# Patient Record
Sex: MALE | Race: WHITE | NOT HISPANIC OR LATINO | Employment: FULL TIME | URBAN - METROPOLITAN AREA
[De-identification: names, ages, dates, MRNs, and addresses within clinical notes are randomized per-mention and may not be internally consistent; named-entity substitution may affect disease eponyms.]

---

## 2019-02-18 ENCOUNTER — OFFICE VISIT (OUTPATIENT)
Dept: URGENT CARE | Facility: CLINIC | Age: 44
End: 2019-02-18
Payer: MEDICAID

## 2019-02-18 VITALS
SYSTOLIC BLOOD PRESSURE: 146 MMHG | TEMPERATURE: 99 F | DIASTOLIC BLOOD PRESSURE: 92 MMHG | HEART RATE: 69 BPM | BODY MASS INDEX: 31.55 KG/M2 | HEIGHT: 69 IN | RESPIRATION RATE: 18 BRPM | WEIGHT: 213 LBS | OXYGEN SATURATION: 98 %

## 2019-02-18 DIAGNOSIS — Z20.828 EXPOSURE TO THE FLU: Primary | ICD-10-CM

## 2019-02-18 PROCEDURE — 99203 OFFICE O/P NEW LOW 30 MIN: CPT | Mod: S$GLB,,, | Performed by: NURSE PRACTITIONER

## 2019-02-18 PROCEDURE — 99203 PR OFFICE/OUTPT VISIT, NEW, LEVL III, 30-44 MIN: ICD-10-PCS | Mod: S$GLB,,, | Performed by: NURSE PRACTITIONER

## 2019-02-18 RX ORDER — OSELTAMIVIR PHOSPHATE 75 MG/1
75 CAPSULE ORAL DAILY
Qty: 10 CAPSULE | Refills: 0 | Status: SHIPPED | OUTPATIENT
Start: 2019-02-18 | End: 2019-02-28

## 2019-02-19 NOTE — PROGRESS NOTES
"Subjective:       Patient ID: Castillo Vasquez is a 43 y.o. male.    Vitals:  height is 5' 9" (1.753 m) and weight is 96.6 kg (213 lb). His oral temperature is 98.5 °F (36.9 °C). His blood pressure is 146/92 (abnormal) and his pulse is 69. His respiration is 18 and oxygen saturation is 98%.     Chief Complaint: Fever    New problem father states exposed to flu by daughter, denies any symptoms       Fever    This is a new problem. The current episode started today. Pertinent negatives include no congestion, coughing, ear pain, nausea, rash, sore throat, vomiting or wheezing. He has tried nothing for the symptoms. The treatment provided no relief.       Constitution: Negative for chills, sweating, fatigue and fever.   HENT: Negative for ear pain, congestion, sinus pain, sinus pressure, sore throat and voice change.    Neck: Negative for painful lymph nodes.   Eyes: Negative for eye redness.   Respiratory: Negative for chest tightness, cough, sputum production, bloody sputum, COPD, shortness of breath, stridor, wheezing and asthma.    Gastrointestinal: Negative for nausea and vomiting.   Musculoskeletal: Negative for muscle ache.   Skin: Negative for rash.   Allergic/Immunologic: Negative for seasonal allergies and asthma.   Hematologic/Lymphatic: Negative for swollen lymph nodes.       Objective:      Physical Exam   Constitutional: He is oriented to person, place, and time. He appears well-developed and well-nourished. He is cooperative.  Non-toxic appearance. He does not appear ill. No distress.   HENT:   Head: Normocephalic and atraumatic.   Right Ear: Hearing, external ear and ear canal normal. A middle ear effusion is present.   Left Ear: Hearing, external ear and ear canal normal. A middle ear effusion is present.   Nose: Nose normal. No mucosal edema, rhinorrhea or nasal deformity. No epistaxis. Right sinus exhibits no maxillary sinus tenderness and no frontal sinus tenderness. Left sinus exhibits no maxillary " sinus tenderness and no frontal sinus tenderness.   Mouth/Throat: Uvula is midline, oropharynx is clear and moist and mucous membranes are normal. No trismus in the jaw. Normal dentition. No uvula swelling. No posterior oropharyngeal erythema.   Eyes: Conjunctivae and lids are normal. Right eye exhibits no discharge. Left eye exhibits no discharge. No scleral icterus.   Sclera clear bilat   Neck: Trachea normal, normal range of motion, full passive range of motion without pain and phonation normal. Neck supple.   Cardiovascular: Normal rate, regular rhythm, normal heart sounds, intact distal pulses and normal pulses.   Pulmonary/Chest: Effort normal and breath sounds normal. No respiratory distress.   Abdominal: Soft. Normal appearance and bowel sounds are normal. He exhibits no distension, no pulsatile midline mass and no mass. There is no tenderness.   Musculoskeletal: Normal range of motion. He exhibits no edema or deformity.   Neurological: He is alert and oriented to person, place, and time. He exhibits normal muscle tone. Coordination normal.   Skin: Skin is warm, dry and intact. He is not diaphoretic. No pallor.   Psychiatric: He has a normal mood and affect. His speech is normal and behavior is normal. Judgment and thought content normal. Cognition and memory are normal.   Nursing note and vitals reviewed.      Assessment:       1. Exposure to the flu        Plan:         Exposure to the flu    Other orders  -     oseltamivir (TAMIFLU) 75 MG capsule; Take 1 capsule (75 mg total) by mouth once daily. for 10 days  Dispense: 10 capsule; Refill: 0    There are no Patient Instructions on file for this visit.

## 2019-05-15 ENCOUNTER — OFFICE VISIT (OUTPATIENT)
Dept: URGENT CARE | Facility: CLINIC | Age: 44
End: 2019-05-15
Payer: MEDICAID

## 2019-05-15 VITALS
DIASTOLIC BLOOD PRESSURE: 94 MMHG | OXYGEN SATURATION: 95 % | WEIGHT: 213 LBS | SYSTOLIC BLOOD PRESSURE: 139 MMHG | BODY MASS INDEX: 31.55 KG/M2 | RESPIRATION RATE: 18 BRPM | HEIGHT: 69 IN | TEMPERATURE: 98 F | HEART RATE: 90 BPM

## 2019-05-15 DIAGNOSIS — R35.0 URINARY FREQUENCY: Primary | ICD-10-CM

## 2019-05-15 LAB
BILIRUB UR QL STRIP: NEGATIVE
GLUCOSE UR QL STRIP: NEGATIVE
KETONES UR QL STRIP: NEGATIVE
LEUKOCYTE ESTERASE UR QL STRIP: NEGATIVE
PH, POC UA: 5 (ref 5–8)
POC BLOOD, URINE: NEGATIVE
POC NITRATES, URINE: NEGATIVE
PROT UR QL STRIP: NEGATIVE
SP GR UR STRIP: 1.02 (ref 1–1.03)
UROBILINOGEN UR STRIP-ACNC: NORMAL (ref 0.3–2.2)

## 2019-05-15 PROCEDURE — 99214 OFFICE O/P EST MOD 30 MIN: CPT | Mod: 25,S$GLB,, | Performed by: FAMILY MEDICINE

## 2019-05-15 PROCEDURE — 81003 URINALYSIS AUTO W/O SCOPE: CPT | Mod: QW,S$GLB,, | Performed by: FAMILY MEDICINE

## 2019-05-15 PROCEDURE — 81003 POCT URINALYSIS, DIPSTICK, AUTOMATED, W/O SCOPE: ICD-10-PCS | Mod: QW,S$GLB,, | Performed by: FAMILY MEDICINE

## 2019-05-15 PROCEDURE — 99214 PR OFFICE/OUTPT VISIT, EST, LEVL IV, 30-39 MIN: ICD-10-PCS | Mod: 25,S$GLB,, | Performed by: FAMILY MEDICINE

## 2019-05-15 RX ORDER — SULFAMETHOXAZOLE AND TRIMETHOPRIM 800; 160 MG/1; MG/1
1 TABLET ORAL 2 TIMES DAILY
Qty: 10 TABLET | Refills: 1 | Status: CANCELLED | OUTPATIENT
Start: 2019-05-15

## 2019-05-15 RX ORDER — SULFAMETHOXAZOLE AND TRIMETHOPRIM 800; 160 MG/1; MG/1
1 TABLET ORAL 2 TIMES DAILY
Qty: 20 TABLET | Refills: 0 | OUTPATIENT
Start: 2019-05-15 | End: 2023-01-08

## 2019-05-16 NOTE — PROGRESS NOTES
"Subjective:       Patient ID: Castillo Vasquez is a 43 y.o. male.    Vitals:  height is 5' 9" (1.753 m) and weight is 96.6 kg (213 lb). His oral temperature is 98 °F (36.7 °C). His blood pressure is 139/94 (abnormal) and his pulse is 90. His respiration is 18 and oxygen saturation is 95%.     Chief Complaint: Back Pain (LOWER) and Urinary Frequency (urgency)    Intermittent lower back and left flank pain with urinary frequency/urgency and decreased output. Felt abdominal discomfort and had loose stool with cramping and nausea a few hours ago. Reports subjective fever. Denies risks for sti.     Back Pain   This is a new problem. The current episode started in the past 7 days. The problem occurs intermittently. The problem has been gradually worsening since onset. The pain is present in the lumbar spine. The quality of the pain is described as aching. Pertinent negatives include no abdominal pain, dysuria or fever.   Urinary Frequency    Associated symptoms include flank pain, frequency, nausea and urgency. Pertinent negatives include no chills, hematuria, vomiting, constipation or rash.       Constitution: Positive for fatigue. Negative for appetite change, chills and fever.   HENT: Negative for ear pain, ear discharge, tinnitus, congestion and sore throat.    Neck: Negative for painful lymph nodes.   Gastrointestinal: Positive for nausea and diarrhea. Negative for abdominal pain, vomiting and constipation.   Genitourinary: Positive for frequency, urgency, urine decreased and flank pain. Negative for dysuria, hematuria, history of kidney stones, genital trauma, painful intercourse, genital sore, penile discharge, painful ejaculation, penile pain, penile swelling, scrotal swelling and testicular pain.   Musculoskeletal: Positive for pain and back pain.   Skin: Negative for rash, lesion, erythema and bruising.   Hematologic/Lymphatic: Negative for swollen lymph nodes.       Objective:      Physical Exam   Constitutional: " He is oriented to person, place, and time. He appears well-developed and well-nourished. No distress.   HENT:   Head: Normocephalic and atraumatic.   Nose: Nose normal. No nasal deformity. No epistaxis.   Mouth/Throat: Mucous membranes are normal.   Eyes: Conjunctivae and lids are normal.   Neck: Trachea normal, normal range of motion and phonation normal. Neck supple.   Cardiovascular: Normal rate.   Pulmonary/Chest: Effort normal.   Abdominal: Soft. Normal appearance. He exhibits no distension. There is no CVA tenderness.   Musculoskeletal: Normal range of motion.   Neurological: He is alert and oriented to person, place, and time. He has normal reflexes.   Skin: Skin is warm, dry and intact. He is not diaphoretic. No erythema.   Psychiatric: He has a normal mood and affect. His speech is normal and behavior is normal. Judgment and thought content normal. Cognition and memory are normal.   Nursing note and vitals reviewed.      Assessment:       1. Urinary frequency        Plan:         Urinary frequency  -     POCT Urinalysis, Dipstick, Automated, W/O Scope  -     Urine culture    Other orders  -     sulfamethoxazole-trimethoprim 800-160mg (BACTRIM DS) 800-160 mg Tab; Take 1 tablet by mouth 2 (two) times daily.  Dispense: 20 tablet; Refill: 0        d/w pt re prostatitis as ddx. Deferred exam at time.  Pt v/u. Advised f/u with pcp

## 2019-05-19 ENCOUNTER — TELEPHONE (OUTPATIENT)
Dept: URGENT CARE | Facility: CLINIC | Age: 44
End: 2019-05-19

## 2019-05-19 LAB
BACTERIA UR CULT: NO GROWTH
BACTERIA UR CULT: NORMAL

## 2019-05-19 NOTE — TELEPHONE ENCOUNTER
1/3 attempts  Left Vm  ----- Message from Johnna Haynes NP sent at 5/19/2019  9:17 AM CDT -----  Please inform patient of negative results

## 2019-05-20 ENCOUNTER — TELEPHONE (OUTPATIENT)
Dept: URGENT CARE | Facility: CLINIC | Age: 44
End: 2019-05-20

## 2019-05-20 NOTE — TELEPHONE ENCOUNTER
Left VM  ----- Message from Johnna Haynes NP sent at 5/19/2019  9:17 AM CDT -----  Please inform patient of negative results

## 2019-05-21 ENCOUNTER — TELEPHONE (OUTPATIENT)
Dept: URGENT CARE | Facility: CLINIC | Age: 44
End: 2019-05-21

## 2019-05-21 NOTE — TELEPHONE ENCOUNTER
----- Message from Johnna Haynes NP sent at 5/19/2019  9:17 AM CDT -----  Please inform patient of negative results

## 2019-10-14 ENCOUNTER — OFFICE VISIT (OUTPATIENT)
Dept: URGENT CARE | Facility: CLINIC | Age: 44
End: 2019-10-14
Payer: MEDICAID

## 2019-10-14 VITALS
DIASTOLIC BLOOD PRESSURE: 90 MMHG | HEART RATE: 93 BPM | RESPIRATION RATE: 16 BRPM | HEIGHT: 69 IN | WEIGHT: 213 LBS | OXYGEN SATURATION: 95 % | BODY MASS INDEX: 31.55 KG/M2 | SYSTOLIC BLOOD PRESSURE: 140 MMHG | TEMPERATURE: 99 F

## 2019-10-14 DIAGNOSIS — Z76.89 ENCOUNTER TO ESTABLISH CARE: ICD-10-CM

## 2019-10-14 DIAGNOSIS — L03.116 CELLULITIS OF LEFT LOWER EXTREMITY: Primary | ICD-10-CM

## 2019-10-14 DIAGNOSIS — L03.011 PARONYCHIA OF RIGHT INDEX FINGER: ICD-10-CM

## 2019-10-14 PROCEDURE — 10060 I&D ABSCESS SIMPLE/SINGLE: CPT | Mod: S$GLB,,, | Performed by: PHYSICIAN ASSISTANT

## 2019-10-14 PROCEDURE — 99214 PR OFFICE/OUTPT VISIT, EST, LEVL IV, 30-39 MIN: ICD-10-PCS | Mod: 25,S$GLB,, | Performed by: PHYSICIAN ASSISTANT

## 2019-10-14 PROCEDURE — 10060 INCISION & DRAINAGE: ICD-10-PCS | Mod: S$GLB,,, | Performed by: PHYSICIAN ASSISTANT

## 2019-10-14 PROCEDURE — 99214 OFFICE O/P EST MOD 30 MIN: CPT | Mod: 25,S$GLB,, | Performed by: PHYSICIAN ASSISTANT

## 2019-10-14 RX ORDER — DOXYCYCLINE HYCLATE 100 MG
100 TABLET ORAL 2 TIMES DAILY
Qty: 20 TABLET | Refills: 0 | Status: SHIPPED | OUTPATIENT
Start: 2019-10-14 | End: 2019-10-24

## 2019-10-14 NOTE — PROCEDURES
Incision & Drainage  Date/Time: 10/14/2019 12:25 PM  Performed by: Hola Doe PA-C  Authorized by: Hola Doe PA-C       Type:  Abscess  Body area:  Upper extremity  Location details:  Right index finger  Local anesthetic: LET (lido,epi,tetracaine)  Scalpel size: 18g needle.  Incision type:  Single straight  Complexity:  Simple  Drainage:  Pus and serosanguinous  Drainage amount:  Scant  Wound treatment:  Wound left open and expression of material  Patient tolerance:  Patient tolerated the procedure well with no immediate complications

## 2019-10-14 NOTE — PATIENT INSTRUCTIONS
"  Primary care at [facility]   Phone: [phone]     · Medicaid Access Line - helps Medicaid patients to find Medicaid care OUTSIDE of [facility]. Medicaid Access Line contact is: [phone]  · www[email or web address] - 8386306653 - a community line that can help refer              · Access Navigator team who handles Medicaid referrals INTO [facility]. The main line for that team (for your referrals into [facility]) is [phone] Access Line - helps Medicaid patients to find Medicaid care OUTSIDE of [facility]. Medicaid Access Line contact is: [phone]  · www.[employer] - 9710604049 - a community line that can help refer          Impetigo  Impetigo is a common bacterial infection of the skin that can appear on many parts of the body. It can happen to anyone, of any age, but is more common in children. For this reason, it used to be called "school sores."  Causes  Its normal to get scrapes on your body from activity or from scratching your skin. The skin normally has bacteria on it. Sometimes an impetigo infection can start on healthy skin. But it usually starts when there is an injury to the skin, or break in the skin. Although nothing usually happens, the bacteria normally on the skin can cause infection. This is the most common way people get impetigo.  Impetigo is very contagious. So once there is an infection, it needs to be treated so it doesn't get worse, spread to other areas, or to other people. Impetigo can easily be passed to other family members, friends, schoolmates, or co-workers, through scratching, rubbing, or touching an infected area. Common causes include:  · After a cold  · Bites  · From another infected person  · Injury to skin  · Insect bites  · Other skin problems that are infected, such as eczema  · Scratches  Symptoms  There is often a skin injury like a scratch, scrape, or insect bite that may have gone unnoticed or been ignored before the infection began. Symptoms of impetigo " include:  · Red, inflamed area or rash  · One or many red bumps  · Bumps that turn into blisters filled with yellow fluid or pus  · Blisters break or leak causing honey-colored crusting or scabbing over the area  · Skin sores that spread to other surrounding areas  Home care  The following guidelines will help you care for your infection at home.  Wound care  · Trim fingernails and cover sores with an adhesive bandage, if needed, to prevent scratching. Picking at the sores may leave a scar.  · If the infection is on or around your lips, don't lick or chew on the sores. This will make the infection worse.  · If a bandage or dressing is used, you can put a nonstick dressing over it.  · Wash your hands and your childs hands often. This will avoid spreading the infection to other parts of the body and to other people. Do not share the infected persons washcloths, towels, pillows, sheets, or clothes with others. Wash these items in hot water before using again.  · Clean the area several times a day. You dont want to scrub the area. The best way to do this is to soak the sores in warm, soapy water until they get soft enough to be wiped away. This will help remove the crust that forms from the dried liquid. In areas that you cant soak, like the mouth or face, you can put a clean, warm washcloth over the infected are for 5 to 10 minutes at a time, until the scabs soften enough to remove.  Medicines  · You can use over-the-counter medicine as directed based on age and weight for pain, fever, fussiness, or discomfort, unless another medicine was prescribed. In infants ages 6 months and older, you may use ibuprofen as well as acetaminophen. You can alternate them, or use both together. They work differently and are a different class of medicines, so taking them together is not an overdose. If you or your child has chronic liver or kidney disease or ever had a stomach ulcer or gastrointestinal bleeding, talk with your  healthcare provider before using these medicines. Also talk with your healthcare provider if your child is taking blood-thinner medicines.  · Do not give aspirin to your child. Aspirin should never be used in children ages 18 and younger who is ill with a fever. It may cause severe disease or death.   · Impetigo can often be cured with topical creams. Apply these as directed by your healthcare provider.  · If you were given oral antibiotics, take them until they are used up. It is important to finish the antibiotics even if the wound looks better to make sure the infection has cleared.  Follow-up care  Follow up with your healthcare provider if the sores continue to spread after 3 days of treatment. It will take about 7 to 10 days to heal completely.  Your child should stay out of school until completing 2 full days of antibiotic treatment.  When to seek medical advice  Call your healthcare provider right away if any of the following occur:  · Fever of 100.4°F (38°C) or higher, or as directed  · Increased amounts of fluid or pus coming from the sores  · Increasing number of sores or spreading areas of redness after 2 days of treatment with antibiotics  · Increasing swelling or pain  · Loss of appetite or vomiting  · Unusual drowsiness, weakness, or change in behavior  Date Last Reviewed: 8/1/2016  © 1051-1740 [employer]. [address] [place] [address], [place] [id number]. All rights reserved. This information is not intended as a substitute for professional medical care. Always follow your healthcare professional's instructions.        Cellulitis  Cellulitis is an infection of the deep layers of skin. A break in the skin, such as a cut or scratch, can let bacteria under the skin. If the bacteria get to deep layers of the skin, it can be serious. If not treated, cellulitis can get into the bloodstream and lymph nodes. The infection can then spread throughout the body. This causes serious illness.  Cellulitis  causes the affected skin to become red, swollen, warm, and sore. The reddened areas have a visible border. An open sore may leak fluid (pus). You may have a fever, chills, and pain.  Cellulitis is treated with antibiotics taken for 7 to 10 days. An open sore may be cleaned and covered with cool wet gauze. Symptoms should get better 1 to 2 days after treatment is started. Make sure to take all the antibiotics for the full number of days until they are gone. Keep taking the medicine even if your symptoms go away.  Home care  Follow these tips:  · Limit the use of the part of your body with cellulitis.   · If the infection is on your leg, keep your leg raised while sitting. This will help to reduce swelling.  · Take all of the antibiotic medicine exactly as directed until it is gone. Do not miss any doses, especially during the first 7 days. Dont stop taking the medicine when your symptoms get better.  · Keep the affected area clean and dry.  · Wash your hands with soap and warm water before and after touching your skin. Anyone else who touches your skin should also wash his or her hands. Don't share towels.  Follow-up care  Follow up with your healthcare provider, or as advised. If your infection does not go away on the first antibiotic, your healthcare provider will prescribe a different one.  When to seek medical advice  Call your healthcare provider right away if any of these occur:  · Red areas that spread  · Swelling or pain that gets worse  · Fluid leaking from the skin (pus)  · Fever higher of 100.4º F (38.0º C) or higher after 2 days on antibiotics  Date Last Reviewed: 9/1/2016 © 2000-2017 [employer]. [address] [place] [address]. All rights reserved. This information is not intended as a substitute for professional medical care. Always follow your healthcare professional's instructions.       If not allergic,take tylenol (acetominophen) for fever control, chills, or body aches  every 4 hours. Do not exceed 4000 mg/ day.If not allergic, take Motrin (Ibuprofen) every 4 hours for fever, chills, pain or inflammation. Do not exceed 2400 mg/day. You can alternate taking tylenol and motrin.    If you were prescribed a narcotic medication, do not drive or operate heavy equipment or machinery while taking these medications.  You must understand that you've received an Urgent Care treatment only and that you may be released before all your medical problems are known or treated. You, the patient, will arrange for follow up care as instructed.  Follow up with your PCP or specialty clinic as directed in the next 1-2 weeks if not improved or as needed.  You can call [phone] to schedule an appointment with the appropriate provider.  If your condition worsens we recommend that you receive another evaluation at the emergency room immediately or contact your primary medical clinics after hours call service to discuss your concerns.    Please return here or go to the Emergency Department for any concerns or worsening of condition.    If you have been referred to another provider and wish to call to check on the status of your referral, please call [facility] Scheduling at [phone]

## 2019-10-14 NOTE — PROGRESS NOTES
"Subjective:       Patient ID: Castillo Vasquez is a 44 y.o. male.    Vitals:  height is 5' 9" (1.753 m) and weight is 96.6 kg (213 lb). His oral temperature is 98.9 °F (37.2 °C). His blood pressure is 140/90 (abnormal) and his pulse is 93. His respiration is 16 and oxygen saturation is 95%.     Chief Complaint: Insect Bite    Pt states that last night he noticed he had a bump on his left lower leg.  States that he popped it.  It drained and drained.  Now it is really red all around it.  He states that he can feel it radiating around the bite. He states that he is not sure if it is staph or not because he has been having issues with what he thinks he staph on his right index finger.     Insect Bite   This is a new problem. The current episode started yesterday. The problem occurs constantly. The problem has been gradually worsening. Pertinent negatives include no arthralgias, chest pain, chills, congestion, coughing, fatigue, fever, headaches, joint swelling, myalgias, nausea, rash, sore throat, vertigo or vomiting. Nothing aggravates the symptoms. He has tried nothing for the symptoms. The treatment provided no relief.       Constitution: Negative for chills, fatigue and fever.   HENT: Negative for congestion and sore throat.    Neck: Negative for painful lymph nodes.   Cardiovascular: Negative for chest pain and leg swelling.   Eyes: Negative for double vision and blurred vision.   Respiratory: Negative for cough and shortness of breath.    Gastrointestinal: Negative for nausea, vomiting and diarrhea.   Genitourinary: Negative for dysuria, frequency and urgency.   Musculoskeletal: Negative for joint pain, joint swelling, muscle cramps and muscle ache.   Skin: Negative for color change, pale and rash.   Allergic/Immunologic: Negative for seasonal allergies.   Neurological: Negative for dizziness, history of vertigo, light-headedness, passing out and headaches.   Hematologic/Lymphatic: Negative for swollen lymph nodes, " easy bruising/bleeding and history of blood clots. Does not bruise/bleed easily.   Psychiatric/Behavioral: Negative for nervous/anxious, sleep disturbance and depression. The patient is not nervous/anxious.        Objective:      Physical Exam   Constitutional: He is oriented to person, place, and time. He appears well-developed and well-nourished. He is cooperative.  Non-toxic appearance. He does not appear ill. No distress.       HENT:   Head: Normocephalic and atraumatic.   Right Ear: Hearing, tympanic membrane, external ear and ear canal normal.   Left Ear: Hearing, tympanic membrane, external ear and ear canal normal.   Nose: Nose normal. No mucosal edema, rhinorrhea or nasal deformity. No epistaxis. Right sinus exhibits no maxillary sinus tenderness and no frontal sinus tenderness. Left sinus exhibits no maxillary sinus tenderness and no frontal sinus tenderness.   Mouth/Throat: Uvula is midline, oropharynx is clear and moist and mucous membranes are normal. No trismus in the jaw. Normal dentition. No uvula swelling. No posterior oropharyngeal erythema.   Eyes: Conjunctivae and lids are normal. Right eye exhibits no discharge. Left eye exhibits no discharge. No scleral icterus.   Neck: Trachea normal, normal range of motion, full passive range of motion without pain and phonation normal. Neck supple.   Cardiovascular: Normal rate, regular rhythm, normal heart sounds, intact distal pulses and normal pulses.   Pulmonary/Chest: Effort normal and breath sounds normal. No respiratory distress.   Abdominal: Soft. Normal appearance and bowel sounds are normal. He exhibits no distension, no pulsatile midline mass and no mass. There is no tenderness.   Musculoskeletal: Normal range of motion. He exhibits no edema or deformity.   Neurological: He is alert and oriented to person, place, and time. He exhibits normal muscle tone. Coordination normal.   Skin: Skin is warm, dry, intact, not diaphoretic and not pale.    Psychiatric: He has a normal mood and affect. His speech is normal and behavior is normal. Judgment and thought content normal. Cognition and memory are normal.   Nursing note and vitals reviewed.        Assessment:       1. Cellulitis of left lower extremity    2. Paronychia of right index finger    3. Encounter to establish care        Plan:         Cellulitis of left lower extremity  -     doxycycline (VIBRA-TABS) 100 MG tablet; Take 1 tablet (100 mg total) by mouth 2 (two) times daily. Take as directed until bottle is empty for 10 days  Dispense: 20 tablet; Refill: 0  -     Ambulatory Referral to Family Practice    Paronychia of right index finger  -     Ambulatory Referral to Family Practice    Encounter to establish care  -     Ambulatory Referral to Family Practice    Incision & Drainage  Date/Time: 10/14/2019 12:25 PM  Performed by: Hola Doe PA-C  Authorized by: Hola Doe PA-C       Type:  Abscess  Body area:  Upper extremity  Location details:  Right index finger  Local anesthetic: LET (lido,epi,tetracaine)  Scalpel size: 18g needle.  Incision type:  Single straight  Complexity:  Simple  Drainage:  Pus and serosanguinous  Drainage amount:  Scant  Wound treatment:  Wound left open and expression of material  Patient tolerance:  Patient tolerated the procedure well with no immediate complications       Patient Instructions     Primary care at [facility]   Phone: [phone]     · Medicaid Access Line - helps Medicaid patients to find Medicaid care OUTSIDE of [facility]. Medicaid Access Line contact is: [phone]  · [email or web address] [place] [email or web address] - [phone] - a community line that can help refer              · Access Navigator team who handles Medicaid referrals INTO [facility]. The main line for that team (for your referrals into [facility]) is [phone] Access Line - helps Medicaid patients to find Medicaid care OUTSIDE of [facility]. Medicaid Access Line contact is:  "[phone]  · [email or web address] [place] [email or web address] - [phone] - a community line that can help refer          Impetigo  Impetigo is a common bacterial infection of the skin that can appear on many parts of the body. It can happen to anyone, of any age, but is more common in children. For this reason, it used to be called "school sores."  Causes  Its normal to get scrapes on your body from activity or from scratching your skin. The skin normally has bacteria on it. Sometimes an impetigo infection can start on healthy skin. But it usually starts when there is an injury to the skin, or break in the skin. Although nothing usually happens, the bacteria normally on the skin can cause infection. This is the most common way people get impetigo.  Impetigo is very contagious. So once there is an infection, it needs to be treated so it doesn't get worse, spread to other areas, or to other people. Impetigo can easily be passed to other family members, friends, schoolmates, or co-workers, through scratching, rubbing, or touching an infected area. Common causes include:  · After a cold  · Bites  · From another infected person  · Injury to skin  · Insect bites  · Other skin problems that are infected, such as eczema  · Scratches  Symptoms  There is often a skin injury like a scratch, scrape, or insect bite that may have gone unnoticed or been ignored before the infection began. Symptoms of impetigo include:  · Red, inflamed area or rash  · One or many red bumps  · Bumps that turn into blisters filled with yellow fluid or pus  · Blisters break or leak causing honey-colored crusting or scabbing over the area  · Skin sores that spread to other surrounding areas  Home care  The following guidelines will help you care for your infection at home.  Wound care  · Trim fingernails and cover sores with an adhesive bandage, if needed, to prevent scratching. Picking at the sores may leave a scar.  · If the infection is on or around your lips, don't lick " or chew on the sores. This will make the infection worse.  · If a bandage or dressing is used, you can put a nonstick dressing over it.  · Wash your hands and your childs hands often. This will avoid spreading the infection to other parts of the body and to other people. Do not share the infected persons washcloths, towels, pillows, sheets, or clothes with others. Wash these items in hot water before using again.  · Clean the area several times a day. You dont want to scrub the area. The best way to do this is to soak the sores in warm, soapy water until they get soft enough to be wiped away. This will help remove the crust that forms from the dried liquid. In areas that you cant soak, like the mouth or face, you can put a clean, warm washcloth over the infected are for 5 to 10 minutes at a time, until the scabs soften enough to remove.  Medicines  · You can use over-the-counter medicine as directed based on age and weight for pain, fever, fussiness, or discomfort, unless another medicine was prescribed. In infants ages 6 months and older, you may use ibuprofen as well as acetaminophen. You can alternate them, or use both together. They work differently and are a different class of medicines, so taking them together is not an overdose. If you or your child has chronic liver or kidney disease or ever had a stomach ulcer or gastrointestinal bleeding, talk with your healthcare provider before using these medicines. Also talk with your healthcare provider if your child is taking blood-thinner medicines.  · Do not give aspirin to your child. Aspirin should never be used in children ages 18 and younger who is ill with a fever. It may cause severe disease or death.   · Impetigo can often be cured with topical creams. Apply these as directed by your healthcare provider.  · If you were given oral antibiotics, take them until they are used up. It is important to finish the antibiotics even if the wound looks better to  make sure the infection has cleared.  Follow-up care  Follow up with your healthcare provider if the sores continue to spread after 3 days of treatment. It will take about 7 to 10 days to heal completely.  Your child should stay out of school until completing 2 full days of antibiotic treatment.  When to seek medical advice  Call your healthcare provider right away if any of the following occur:  · Fever of 100.4°F (38°C) or higher, or as directed  · Increased amounts of fluid or pus coming from the sores  · Increasing number of sores or spreading areas of redness after 2 days of treatment with antibiotics  · Increasing swelling or pain  · Loss of appetite or vomiting  · Unusual drowsiness, weakness, or change in behavior  Date Last Reviewed: 8/1/2016 © 2000-2017 [employer]. [address] [place] [address], [place] [address]. All rights reserved. This information is not intended as a substitute for professional medical care. Always follow your healthcare professional's instructions.        Cellulitis  Cellulitis is an infection of the deep layers of skin. A break in the skin, such as a cut or scratch, can let bacteria under the skin. If the bacteria get to deep layers of the skin, it can be serious. If not treated, cellulitis can get into the bloodstream and lymph nodes. The infection can then spread throughout the body. This causes serious illness.  Cellulitis causes the affected skin to become red, swollen, warm, and sore. The reddened areas have a visible border. An open sore may leak fluid (pus). You may have a fever, chills, and pain.  Cellulitis is treated with antibiotics taken for 7 to 10 days. An open sore may be cleaned and covered with cool wet gauze. Symptoms should get better 1 to 2 days after treatment is started. Make sure to take all the antibiotics for the full number of days until they are gone. Keep taking the medicine even if your symptoms go away.  Home care  Follow these tips:  · Limit  the use of the part of your body with cellulitis.   · If the infection is on your leg, keep your leg raised while sitting. This will help to reduce swelling.  · Take all of the antibiotic medicine exactly as directed until it is gone. Do not miss any doses, especially during the first 7 days. Dont stop taking the medicine when your symptoms get better.  · Keep the affected area clean and dry.  · Wash your hands with soap and warm water before and after touching your skin. Anyone else who touches your skin should also wash his or her hands. Don't share towels.  Follow-up care  Follow up with your healthcare provider, or as advised. If your infection does not go away on the first antibiotic, your healthcare provider will prescribe a different one.  When to seek medical advice  Call your healthcare provider right away if any of these occur:  · Red areas that spread  · Swelling or pain that gets worse  · Fluid leaking from the skin (pus)  · Fever higher of 100.4º F (38.0º C) or higher after 2 days on antibiotics  Date Last Reviewed: 9/1/2016  © 5009-5758 [employer]. [address] [place] [address]. All rights reserved. This information is not intended as a substitute for professional medical care. Always follow your healthcare professional's instructions.       If not allergic,take tylenol (acetominophen) for fever control, chills, or body aches every 4 hours. Do not exceed 4000 mg/ day.If not allergic, take Motrin (Ibuprofen) every 4 hours for fever, chills, pain or inflammation. Do not exceed 2400 mg/day. You can alternate taking tylenol and motrin.    If you were prescribed a narcotic medication, do not drive or operate heavy equipment or machinery while taking these medications.  You must understand that you've received an Urgent Care treatment only and that you may be released before all your medical problems are known or treated. You, the patient, will arrange for follow up care as  instructed.  Follow up with your PCP or specialty clinic as directed in the next 1-2 weeks if not improved or as needed.  You can call [phone] to schedule an appointment with the appropriate provider.  If your condition worsens we recommend that you receive another evaluation at the emergency room immediately or contact your primary medical clinics after hours call service to discuss your concerns.    Please return here or go to the Emergency Department for any concerns or worsening of condition.    If you have been referred to another provider and wish to call to check on the status of your referral, please call [facility] Scheduling at [phone]

## 2020-07-28 ENCOUNTER — LAB VISIT (OUTPATIENT)
Dept: LAB | Facility: HOSPITAL | Age: 45
End: 2020-07-28
Attending: PEDIATRICS
Payer: MEDICAID

## 2020-07-28 DIAGNOSIS — Z01.89 ROUTINE LAB DRAW: Primary | ICD-10-CM

## 2020-07-28 LAB
25(OH)D3+25(OH)D2 SERPL-MCNC: 21 NG/ML (ref 30–96)
ALBUMIN SERPL BCP-MCNC: 4.2 G/DL (ref 3.5–5.2)
ALP SERPL-CCNC: 55 U/L (ref 55–135)
ALT SERPL W/O P-5'-P-CCNC: 20 U/L (ref 10–44)
ANION GAP SERPL CALC-SCNC: 8 MMOL/L (ref 8–16)
AST SERPL-CCNC: 15 U/L (ref 10–40)
BASOPHILS # BLD AUTO: 0.03 K/UL (ref 0–0.2)
BASOPHILS NFR BLD: 0.6 % (ref 0–1.9)
BILIRUB SERPL-MCNC: 0.6 MG/DL (ref 0.1–1)
BUN SERPL-MCNC: 14 MG/DL (ref 6–20)
CALCIUM SERPL-MCNC: 9.5 MG/DL (ref 8.7–10.5)
CHLORIDE SERPL-SCNC: 108 MMOL/L (ref 95–110)
CHOLEST SERPL-MCNC: 159 MG/DL (ref 120–199)
CHOLEST/HDLC SERPL: 4.3 {RATIO} (ref 2–5)
CO2 SERPL-SCNC: 25 MMOL/L (ref 23–29)
CREAT SERPL-MCNC: 1.2 MG/DL (ref 0.5–1.4)
DIFFERENTIAL METHOD: ABNORMAL
EOSINOPHIL # BLD AUTO: 0.2 K/UL (ref 0–0.5)
EOSINOPHIL NFR BLD: 3.3 % (ref 0–8)
ERYTHROCYTE [DISTWIDTH] IN BLOOD BY AUTOMATED COUNT: 13.1 % (ref 11.5–14.5)
EST. GFR  (AFRICAN AMERICAN): >60 ML/MIN/1.73 M^2
EST. GFR  (NON AFRICAN AMERICAN): >60 ML/MIN/1.73 M^2
FOLATE SERPL-MCNC: 9.1 NG/ML (ref 4–24)
GLUCOSE SERPL-MCNC: 95 MG/DL (ref 70–110)
HCT VFR BLD AUTO: 44.7 % (ref 40–54)
HDLC SERPL-MCNC: 37 MG/DL (ref 40–75)
HDLC SERPL: 23.3 % (ref 20–50)
HGB BLD-MCNC: 13.7 G/DL (ref 14–18)
IMM GRANULOCYTES # BLD AUTO: 0.01 K/UL (ref 0–0.04)
IMM GRANULOCYTES NFR BLD AUTO: 0.2 % (ref 0–0.5)
LDLC SERPL CALC-MCNC: 78.4 MG/DL (ref 63–159)
LYMPHOCYTES # BLD AUTO: 1.5 K/UL (ref 1–4.8)
LYMPHOCYTES NFR BLD: 26.9 % (ref 18–48)
MCH RBC QN AUTO: 28.8 PG (ref 27–31)
MCHC RBC AUTO-ENTMCNC: 30.6 G/DL (ref 32–36)
MCV RBC AUTO: 94 FL (ref 82–98)
MONOCYTES # BLD AUTO: 0.5 K/UL (ref 0.3–1)
MONOCYTES NFR BLD: 9.4 % (ref 4–15)
NEUTROPHILS # BLD AUTO: 3.2 K/UL (ref 1.8–7.7)
NEUTROPHILS NFR BLD: 59.6 % (ref 38–73)
NONHDLC SERPL-MCNC: 122 MG/DL
NRBC BLD-RTO: 0 /100 WBC
PLATELET # BLD AUTO: 212 K/UL (ref 150–350)
PMV BLD AUTO: 10.5 FL (ref 9.2–12.9)
POTASSIUM SERPL-SCNC: 3.8 MMOL/L (ref 3.5–5.1)
PROT SERPL-MCNC: 7.2 G/DL (ref 6–8.4)
RBC # BLD AUTO: 4.75 M/UL (ref 4.6–6.2)
SODIUM SERPL-SCNC: 141 MMOL/L (ref 136–145)
T4 SERPL-MCNC: 5.3 UG/DL (ref 4.5–11.5)
TRIGL SERPL-MCNC: 218 MG/DL (ref 30–150)
TSH SERPL DL<=0.005 MIU/L-ACNC: 1.37 UIU/ML (ref 0.4–4)
VIT B12 SERPL-MCNC: 430 PG/ML (ref 210–950)
WBC # BLD AUTO: 5.4 K/UL (ref 3.9–12.7)

## 2020-07-28 PROCEDURE — 80053 COMPREHEN METABOLIC PANEL: CPT

## 2020-07-28 PROCEDURE — 84436 ASSAY OF TOTAL THYROXINE: CPT

## 2020-07-28 PROCEDURE — 82607 VITAMIN B-12: CPT

## 2020-07-28 PROCEDURE — 85025 COMPLETE CBC W/AUTO DIFF WBC: CPT

## 2020-07-28 PROCEDURE — 80061 LIPID PANEL: CPT

## 2020-07-28 PROCEDURE — 84443 ASSAY THYROID STIM HORMONE: CPT

## 2020-07-28 PROCEDURE — 82306 VITAMIN D 25 HYDROXY: CPT

## 2020-07-28 PROCEDURE — 36415 COLL VENOUS BLD VENIPUNCTURE: CPT | Mod: PN

## 2020-07-28 PROCEDURE — 83036 HEMOGLOBIN GLYCOSYLATED A1C: CPT

## 2020-07-28 PROCEDURE — 82746 ASSAY OF FOLIC ACID SERUM: CPT

## 2020-07-29 LAB
ESTIMATED AVG GLUCOSE: 100 MG/DL (ref 68–131)
HBA1C MFR BLD HPLC: 5.1 % (ref 4–5.6)

## 2021-01-20 ENCOUNTER — OFFICE VISIT (OUTPATIENT)
Dept: URGENT CARE | Facility: CLINIC | Age: 46
End: 2021-01-20
Payer: MEDICAID

## 2021-01-20 VITALS
BODY MASS INDEX: 31.4 KG/M2 | RESPIRATION RATE: 16 BRPM | WEIGHT: 212 LBS | DIASTOLIC BLOOD PRESSURE: 96 MMHG | TEMPERATURE: 99 F | SYSTOLIC BLOOD PRESSURE: 145 MMHG | HEIGHT: 69 IN | OXYGEN SATURATION: 97 % | HEART RATE: 78 BPM

## 2021-01-20 DIAGNOSIS — U07.1 COVID-19 VIRUS INFECTION: ICD-10-CM

## 2021-01-20 DIAGNOSIS — U07.1 COVID-19 VIRUS DETECTED: ICD-10-CM

## 2021-01-20 DIAGNOSIS — R50.9 FEVER, UNSPECIFIED FEVER CAUSE: Primary | ICD-10-CM

## 2021-01-20 LAB
CTP QC/QA: YES
SARS-COV-2 RDRP RESP QL NAA+PROBE: POSITIVE

## 2021-01-20 PROCEDURE — U0002: ICD-10-PCS | Mod: QW,S$GLB,, | Performed by: FAMILY MEDICINE

## 2021-01-20 PROCEDURE — 99214 PR OFFICE/OUTPT VISIT, EST, LEVL IV, 30-39 MIN: ICD-10-PCS | Mod: S$GLB,,, | Performed by: FAMILY MEDICINE

## 2021-01-20 PROCEDURE — 99214 OFFICE O/P EST MOD 30 MIN: CPT | Mod: S$GLB,,, | Performed by: FAMILY MEDICINE

## 2021-01-20 PROCEDURE — U0002 COVID-19 LAB TEST NON-CDC: HCPCS | Mod: QW,S$GLB,, | Performed by: FAMILY MEDICINE

## 2021-01-20 RX ORDER — CLONAZEPAM 1 MG/1
TABLET ORAL
COMMUNITY
Start: 2020-11-06 | End: 2023-01-08

## 2021-01-24 ENCOUNTER — NURSE TRIAGE (OUTPATIENT)
Dept: ADMINISTRATIVE | Facility: CLINIC | Age: 46
End: 2021-01-24

## 2021-01-27 ENCOUNTER — NURSE TRIAGE (OUTPATIENT)
Dept: ADMINISTRATIVE | Facility: CLINIC | Age: 46
End: 2021-01-27

## 2021-01-29 ENCOUNTER — NURSE TRIAGE (OUTPATIENT)
Dept: ADMINISTRATIVE | Facility: CLINIC | Age: 46
End: 2021-01-29

## 2021-03-05 ENCOUNTER — TELEPHONE (OUTPATIENT)
Dept: GASTROENTEROLOGY | Facility: CLINIC | Age: 46
End: 2021-03-05

## 2023-01-08 ENCOUNTER — HOSPITAL ENCOUNTER (EMERGENCY)
Facility: HOSPITAL | Age: 48
Discharge: HOME | End: 2023-01-08
Attending: EMERGENCY MEDICINE
Payer: MEDICAID

## 2023-01-08 VITALS
OXYGEN SATURATION: 99 % | HEIGHT: 69 IN | BODY MASS INDEX: 34.45 KG/M2 | DIASTOLIC BLOOD PRESSURE: 80 MMHG | HEART RATE: 74 BPM | RESPIRATION RATE: 18 BRPM | TEMPERATURE: 98 F | SYSTOLIC BLOOD PRESSURE: 137 MMHG | WEIGHT: 232.56 LBS

## 2023-01-08 DIAGNOSIS — R03.0 ELEVATED BLOOD PRESSURE READING WITHOUT DIAGNOSIS OF HYPERTENSION: ICD-10-CM

## 2023-01-08 DIAGNOSIS — F41.9 ANXIETY: ICD-10-CM

## 2023-01-08 DIAGNOSIS — R00.0 RAPID HEART BEAT: ICD-10-CM

## 2023-01-08 DIAGNOSIS — R00.2 PALPITATIONS: Primary | ICD-10-CM

## 2023-01-08 LAB
ALBUMIN SERPL BCP-MCNC: 4.2 G/DL (ref 3.5–5.2)
ALP SERPL-CCNC: 66 U/L (ref 55–135)
ALT SERPL W/O P-5'-P-CCNC: 27 U/L (ref 10–44)
ANION GAP SERPL CALC-SCNC: 11 MMOL/L (ref 8–16)
AST SERPL-CCNC: 22 U/L (ref 10–40)
BASOPHILS # BLD AUTO: 0.04 K/UL (ref 0–0.2)
BASOPHILS NFR BLD: 0.8 % (ref 0–1.9)
BILIRUB SERPL-MCNC: 0.5 MG/DL (ref 0.1–1)
BNP SERPL-MCNC: <10 PG/ML (ref 0–99)
BUN SERPL-MCNC: 14 MG/DL (ref 6–20)
CALCIUM SERPL-MCNC: 9.6 MG/DL (ref 8.7–10.5)
CHLORIDE SERPL-SCNC: 109 MMOL/L (ref 95–110)
CO2 SERPL-SCNC: 21 MMOL/L (ref 23–29)
CREAT SERPL-MCNC: 1 MG/DL (ref 0.5–1.4)
DIFFERENTIAL METHOD: NORMAL
EOSINOPHIL # BLD AUTO: 0.1 K/UL (ref 0–0.5)
EOSINOPHIL NFR BLD: 2.2 % (ref 0–8)
ERYTHROCYTE [DISTWIDTH] IN BLOOD BY AUTOMATED COUNT: 12.4 % (ref 11.5–14.5)
EST. GFR  (NO RACE VARIABLE): >60 ML/MIN/1.73 M^2
GLUCOSE SERPL-MCNC: 108 MG/DL (ref 70–110)
HCT VFR BLD AUTO: 42.5 % (ref 40–54)
HGB BLD-MCNC: 14.7 G/DL (ref 14–18)
IMM GRANULOCYTES # BLD AUTO: 0.02 K/UL (ref 0–0.04)
IMM GRANULOCYTES NFR BLD AUTO: 0.4 % (ref 0–0.5)
LYMPHOCYTES # BLD AUTO: 1 K/UL (ref 1–4.8)
LYMPHOCYTES NFR BLD: 18.6 % (ref 18–48)
MAGNESIUM SERPL-MCNC: 2 MG/DL (ref 1.6–2.6)
MCH RBC QN AUTO: 29.8 PG (ref 27–31)
MCHC RBC AUTO-ENTMCNC: 34.6 G/DL (ref 32–36)
MCV RBC AUTO: 86 FL (ref 82–98)
MONOCYTES # BLD AUTO: 0.4 K/UL (ref 0.3–1)
MONOCYTES NFR BLD: 8.2 % (ref 4–15)
NEUTROPHILS # BLD AUTO: 3.6 K/UL (ref 1.8–7.7)
NEUTROPHILS NFR BLD: 69.8 % (ref 38–73)
NRBC BLD-RTO: 0 /100 WBC
PLATELET # BLD AUTO: 232 K/UL (ref 150–450)
PMV BLD AUTO: 9.6 FL (ref 9.2–12.9)
POTASSIUM SERPL-SCNC: 3.7 MMOL/L (ref 3.5–5.1)
PROT SERPL-MCNC: 7.4 G/DL (ref 6–8.4)
RBC # BLD AUTO: 4.94 M/UL (ref 4.6–6.2)
SODIUM SERPL-SCNC: 141 MMOL/L (ref 136–145)
TROPONIN I SERPL DL<=0.01 NG/ML-MCNC: 0.01 NG/ML (ref 0–0.03)
TSH SERPL DL<=0.005 MIU/L-ACNC: 1.81 UIU/ML (ref 0.4–4)
WBC # BLD AUTO: 5.11 K/UL (ref 3.9–12.7)

## 2023-01-08 PROCEDURE — 84443 ASSAY THYROID STIM HORMONE: CPT | Performed by: EMERGENCY MEDICINE

## 2023-01-08 PROCEDURE — 93005 ELECTROCARDIOGRAM TRACING: CPT

## 2023-01-08 PROCEDURE — 85025 COMPLETE CBC W/AUTO DIFF WBC: CPT | Performed by: EMERGENCY MEDICINE

## 2023-01-08 PROCEDURE — 84484 ASSAY OF TROPONIN QUANT: CPT | Performed by: EMERGENCY MEDICINE

## 2023-01-08 PROCEDURE — 93010 EKG 12-LEAD: ICD-10-PCS | Mod: ,,, | Performed by: INTERNAL MEDICINE

## 2023-01-08 PROCEDURE — 99285 EMERGENCY DEPT VISIT HI MDM: CPT | Mod: 25

## 2023-01-08 PROCEDURE — 83735 ASSAY OF MAGNESIUM: CPT | Performed by: EMERGENCY MEDICINE

## 2023-01-08 PROCEDURE — 93010 ELECTROCARDIOGRAM REPORT: CPT | Mod: ,,, | Performed by: INTERNAL MEDICINE

## 2023-01-08 PROCEDURE — 80053 COMPREHEN METABOLIC PANEL: CPT | Performed by: EMERGENCY MEDICINE

## 2023-01-08 PROCEDURE — 25000003 PHARM REV CODE 250: Performed by: EMERGENCY MEDICINE

## 2023-01-08 PROCEDURE — 83880 ASSAY OF NATRIURETIC PEPTIDE: CPT | Performed by: EMERGENCY MEDICINE

## 2023-01-08 RX ORDER — HYDROXYZINE PAMOATE 25 MG/1
25 CAPSULE ORAL
Status: COMPLETED | OUTPATIENT
Start: 2023-01-08 | End: 2023-01-08

## 2023-01-08 RX ORDER — PROPRANOLOL HYDROCHLORIDE 20 MG/1
20 TABLET ORAL
Status: COMPLETED | OUTPATIENT
Start: 2023-01-08 | End: 2023-01-08

## 2023-01-08 RX ORDER — HYDROXYZINE PAMOATE 25 MG/1
25 CAPSULE ORAL EVERY 6 HOURS PRN
Qty: 20 CAPSULE | Refills: 0 | Status: SHIPPED | OUTPATIENT
Start: 2023-01-08 | End: 2023-01-27

## 2023-01-08 RX ORDER — PROPRANOLOL HYDROCHLORIDE 20 MG/1
10 TABLET ORAL 2 TIMES DAILY
Qty: 60 TABLET | Refills: 0 | Status: SHIPPED | OUTPATIENT
Start: 2023-01-08 | End: 2024-01-08

## 2023-01-08 RX ADMIN — PROPRANOLOL HYDROCHLORIDE 20 MG: 20 TABLET ORAL at 11:01

## 2023-01-08 RX ADMIN — HYDROXYZINE PAMOATE 25 MG: 25 CAPSULE ORAL at 11:01

## 2023-01-08 NOTE — ED PROVIDER NOTES
SCRIBE #1 NOTE: I, Jose Juan Ibarra, am scribing for, and in the presence of, Norma Hernandez MD. I have scribed the entire note.       History     Chief Complaint   Patient presents with    Palpitations     Pt c/o weird feeling in this chest that began 2 hours ago with nausea and feeling of palpitations.  Pt denies history of cardiac complaints.  He reports a history of anxiety and has been out of his clonazepam.       Review of patient's allergies indicates:   Allergen Reactions    No known drug allergies          History of Present Illness     HPI    1/8/2023, 11:48 AM  History obtained from the patient      History of Present Illness: Castillo Vasquez is a 47 y.o. male patient with a PMHx of anxiety and GERD who presents to the Emergency Department for evaluation of palpitations which onset x few hours PTA. Symptoms are constant and moderate in severity. No mitigating or exacerbating factors reported. Associated sxs include sensation of indigestion, nausea, and anxiety. Patient denies any SOB, CP, leg swelling, fever, chills, HA, numbness, and all other sxs at this time. Pt was prescribed clonazepam and used to take this medication as needed. However, pt has been out of this medication for several months now; pt reports he is seeing PCP on 1/23/23 and will address this. No further complaints or concerns at this time.       Arrival mode: Personal Transportation    PCP: Primary Doctor No        Past Medical History:  Past Medical History:   Diagnosis Date    Allergy     Asthma     GERD (gastroesophageal reflux disease)        Past Surgical History:  Past Surgical History:   Procedure Laterality Date    FRACTURE SURGERY      left arm, steel haroon         Family History:  Family History   Problem Relation Age of Onset    Cancer Father         skin    Cancer Sister         throat cancer- age 40, non smoker    Heart disease Maternal Grandfather     Cancer Paternal Grandmother         lung cancer, active smoker       Social  History:  Social History     Tobacco Use    Smoking status: Never    Smokeless tobacco: Never   Substance and Sexual Activity    Alcohol use: No    Drug use: No    Sexual activity: Not on file        Review of Systems     Review of Systems   Constitutional:  Negative for chills and fever.   HENT:  Negative for sore throat.    Respiratory:  Negative for shortness of breath.    Cardiovascular:  Positive for palpitations. Negative for chest pain.   Gastrointestinal:  Positive for nausea.        (+) sensation of indigestion   Genitourinary:  Negative for dysuria.   Musculoskeletal:  Negative for back pain.   Skin:  Negative for rash.   Neurological:  Negative for weakness, numbness and headaches.   Hematological:  Does not bruise/bleed easily.   Psychiatric/Behavioral:  The patient is nervous/anxious.    All other systems reviewed and are negative.     Physical Exam     Initial Vitals [01/08/23 1059]   BP Pulse Resp Temp SpO2   (!) 175/102 104 18 97.6 °F (36.4 °C) 96 %      MAP       --          Physical Exam  Nursing Notes and Vital Signs Reviewed.  Constitutional: Patient is in no acute distress. Anxious. Well-developed and well-nourished.  Head: Atraumatic. Normocephalic.  Eyes: PERRL. EOM intact. Conjunctivae are not pale. No scleral icterus.  ENT: Mucous membranes are moist. Oropharynx is clear and symmetric.    Neck: Supple. Full ROM. No lymphadenopathy.  Cardiovascular: Regular rate. Regular rhythm. No murmurs, rubs, or gallops. Distal pulses are 2+ and symmetric.  Pulmonary/Chest: No respiratory distress. Clear to auscultation bilaterally. No wheezing or rales.  Abdominal: Soft and non-distended.  There is no tenderness.  No rebound, guarding, or rigidity.  Musculoskeletal: Moves all extremities. No obvious deformities. No edema.  Skin: Warm and dry.  Neurological:  Alert, awake, and appropriate.  Normal speech.  No acute focal neurological deficits are appreciated.  Psychiatric: Normal affect. Good eye contact.  "Appropriate in content.     ED Course   Procedures  ED Vital Signs:  Vitals:    01/08/23 1059 01/08/23 1114 01/08/23 1124 01/08/23 1125   BP: (!) 175/102 (!) 151/98     Pulse: 104 95 84 82   Resp: 18 (!) 7 15    Temp: 97.6 °F (36.4 °C)      TempSrc: Oral      SpO2: 96% (!) 94% 96%    Weight: 105.5 kg (232 lb 9.4 oz)      Height: 5' 9" (1.753 m)       01/08/23 1200 01/08/23 1232 01/08/23 1312   BP: (!) 149/90 (!) 141/83 137/80   Pulse: 79 71 74   Resp:   18   Temp:   98 °F (36.7 °C)   TempSrc:   Oral   SpO2: 97% 97% 99%   Weight:      Height:          Abnormal Lab Results:  Labs Reviewed   COMPREHENSIVE METABOLIC PANEL - Abnormal; Notable for the following components:       Result Value    CO2 21 (*)     All other components within normal limits   CBC W/ AUTO DIFFERENTIAL   TROPONIN I   B-TYPE NATRIURETIC PEPTIDE   TSH   MAGNESIUM        All Lab Results:  Results for orders placed or performed during the hospital encounter of 01/08/23   CBC auto differential   Result Value Ref Range    WBC 5.11 3.90 - 12.70 K/uL    RBC 4.94 4.60 - 6.20 M/uL    Hemoglobin 14.7 14.0 - 18.0 g/dL    Hematocrit 42.5 40.0 - 54.0 %    MCV 86 82 - 98 fL    MCH 29.8 27.0 - 31.0 pg    MCHC 34.6 32.0 - 36.0 g/dL    RDW 12.4 11.5 - 14.5 %    Platelets 232 150 - 450 K/uL    MPV 9.6 9.2 - 12.9 fL    Immature Granulocytes 0.4 0.0 - 0.5 %    Gran # (ANC) 3.6 1.8 - 7.7 K/uL    Immature Grans (Abs) 0.02 0.00 - 0.04 K/uL    Lymph # 1.0 1.0 - 4.8 K/uL    Mono # 0.4 0.3 - 1.0 K/uL    Eos # 0.1 0.0 - 0.5 K/uL    Baso # 0.04 0.00 - 0.20 K/uL    nRBC 0 0 /100 WBC    Gran % 69.8 38.0 - 73.0 %    Lymph % 18.6 18.0 - 48.0 %    Mono % 8.2 4.0 - 15.0 %    Eosinophil % 2.2 0.0 - 8.0 %    Basophil % 0.8 0.0 - 1.9 %    Differential Method Automated    Comprehensive metabolic panel   Result Value Ref Range    Sodium 141 136 - 145 mmol/L    Potassium 3.7 3.5 - 5.1 mmol/L    Chloride 109 95 - 110 mmol/L    CO2 21 (L) 23 - 29 mmol/L    Glucose 108 70 - 110 mg/dL    BUN " 14 6 - 20 mg/dL    Creatinine 1.0 0.5 - 1.4 mg/dL    Calcium 9.6 8.7 - 10.5 mg/dL    Total Protein 7.4 6.0 - 8.4 g/dL    Albumin 4.2 3.5 - 5.2 g/dL    Total Bilirubin 0.5 0.1 - 1.0 mg/dL    Alkaline Phosphatase 66 55 - 135 U/L    AST 22 10 - 40 U/L    ALT 27 10 - 44 U/L    Anion Gap 11 8 - 16 mmol/L    eGFR >60 >60 mL/min/1.73 m^2   Troponin I #1   Result Value Ref Range    Troponin I 0.011 0.000 - 0.026 ng/mL   BNP   Result Value Ref Range    BNP <10 0 - 99 pg/mL   TSH   Result Value Ref Range    TSH 1.806 0.400 - 4.000 uIU/mL   Magnesium   Result Value Ref Range    Magnesium 2.0 1.6 - 2.6 mg/dL         Imaging Results:  Imaging Results              X-Ray Chest PA And Lateral (Final result)  Result time 01/08/23 11:54:16      Final result by Aidan Torres MD (01/08/23 11:54:16)                   Impression:      No acute findings.      Electronically signed by: Aidan Torres MD  Date:    01/08/2023  Time:    11:54               Narrative:    EXAMINATION:  XR CHEST PA AND LATERAL    CLINICAL HISTORY:  chest pain;    TECHNIQUE:  PA and lateral views of the chest were performed.    COMPARISON:  None    FINDINGS:  The cardiac and mediastinal silhouettes appear within normal limits.   The lungs are clear bilaterally.  No acute osseous findings demonstrated.                                       The EKG was ordered, reviewed, and independently interpreted by the ED provider.  Interpretation time: 11:02 AM  Rate: 105 BPM  Rhythm: sinus tachycardia  Interpretation: Rightward axis. ST & T wave abnormality, consider inferior ischemia. No STEMI.           The Emergency Provider reviewed the vital signs and test results, which are outlined above.     ED Discussion       12:50 PM: Reassessed pt at this time. Discussed with pt all pertinent ED information and results. Discussed pt dx and plan of tx. Gave pt all f/u and return to the ED instructions. All questions and concerns were addressed at this time. Pt expresses understanding  of information and instructions, and is comfortable with plan to discharge. Pt is stable for discharge.    I discussed with patient and/or family/caretaker that evaluation in the ED does not suggest any emergent or life threatening medical conditions requiring immediate intervention beyond what was provided in the ED, and I believe patient is safe for discharge.  Regardless, an unremarkable evaluation in the ED does not preclude the development or presence of a serious of life threatening condition. As such, patient was instructed to return immediately for any worsening or change in current symptoms.         Medical Decision Making:   Differential Diagnosis:   1. ACS: normal ECG and Troponin  2. Anxiety  3. GERD  Clinical Tests:   Lab Tests: Ordered and Reviewed  Radiological Study: Ordered and Reviewed  Medical Tests: Ordered and Reviewed  ED Management:  Patient appears to have anxiety causing his palpitations, cardiac workup normal, no risk factors for ACS, improved after vystaril and   propranolol.          ED Medication(s):  Medications   hydrOXYzine pamoate capsule 25 mg (25 mg Oral Given 1/8/23 1125)   propranoloL tablet 20 mg (20 mg Oral Given 1/8/23 1126)       Discharge Medication List as of 1/8/2023 12:50 PM        START taking these medications    Details   hydrOXYzine pamoate (VISTARIL) 25 MG Cap Take 1 capsule (25 mg total) by mouth every 6 (six) hours as needed (anxiety)., Starting Sun 1/8/2023, Print      propranoloL (INDERAL) 20 MG tablet Take 0.5 tablets (10 mg total) by mouth 2 (two) times daily., Starting Sun 1/8/2023, Until Mon 1/8/2024, Print              Follow-up Information       The [address]. Schedule an appointment as soon as possible for a visit in 2 days.    Specialty: Internal Medicine  Why: Return to the Emergency Room, If symptoms worsen  Contact information:  [address] [facility] [id number] [phone]  Additional information:  Please park  on the [address] side and use the Clinic entrance. Check in on the 2nd floor, to the right.                               Scribe Attestation:   Scribe #1: I performed the above scribed service and the documentation accurately describes the services I performed. I attest to the accuracy of the note.     Attending:   Physician Attestation Statement for Scribe #1: I, Norma Hernandez MD, personally performed the services described in this documentation, as scribed by Jose Juan Ibarra, in my presence, and it is both accurate and complete.           Clinical Impression       ICD-10-CM ICD-9-CM   1. Palpitations  R00.2 785.1   2. Rapid heart beat  R00.0 785.0   3. Anxiety  F41.9 300.00   4. Elevated blood pressure reading without diagnosis of hypertension  R03.0 796.2       Disposition:   Disposition: Discharged  Condition: Stable       Norma Hernandez MD  01/08/23 3220

## 2023-01-27 ENCOUNTER — OFFICE VISIT (OUTPATIENT)
Dept: CARDIOLOGY | Facility: CLINIC | Age: 48
End: 2023-01-27
Payer: MEDICAID

## 2023-01-27 VITALS
WEIGHT: 235.69 LBS | SYSTOLIC BLOOD PRESSURE: 132 MMHG | BODY MASS INDEX: 34.91 KG/M2 | DIASTOLIC BLOOD PRESSURE: 90 MMHG | RESPIRATION RATE: 16 BRPM | HEIGHT: 69 IN | OXYGEN SATURATION: 95 % | HEART RATE: 87 BPM

## 2023-01-27 DIAGNOSIS — R00.2 PALPITATIONS: ICD-10-CM

## 2023-01-27 DIAGNOSIS — Z82.49 FAMILY HISTORY OF CARDIOVASCULAR DISEASE: ICD-10-CM

## 2023-01-27 DIAGNOSIS — R07.89 ATYPICAL CHEST PAIN: Primary | ICD-10-CM

## 2023-01-27 DIAGNOSIS — R94.31 ABNORMAL ECG: ICD-10-CM

## 2023-01-27 DIAGNOSIS — E78.1 HYPERTRIGLYCERIDEMIA: ICD-10-CM

## 2023-01-27 DIAGNOSIS — I10 ESSENTIAL HYPERTENSION: ICD-10-CM

## 2023-01-27 PROCEDURE — 3075F SYST BP GE 130 - 139MM HG: CPT | Mod: CPTII,,, | Performed by: INTERNAL MEDICINE

## 2023-01-27 PROCEDURE — 3008F PR BODY MASS INDEX (BMI) DOCUMENTED: ICD-10-PCS | Mod: CPTII,,, | Performed by: INTERNAL MEDICINE

## 2023-01-27 PROCEDURE — 3080F DIAST BP >= 90 MM HG: CPT | Mod: CPTII,,, | Performed by: INTERNAL MEDICINE

## 2023-01-27 PROCEDURE — 99213 OFFICE O/P EST LOW 20 MIN: CPT | Mod: PBBFAC | Performed by: INTERNAL MEDICINE

## 2023-01-27 PROCEDURE — 99999 PR PBB SHADOW E&M-EST. PATIENT-LVL III: ICD-10-PCS | Mod: PBBFAC,,, | Performed by: INTERNAL MEDICINE

## 2023-01-27 PROCEDURE — 99205 PR OFFICE/OUTPT VISIT, NEW, LEVL V, 60-74 MIN: ICD-10-PCS | Mod: S$PBB,,, | Performed by: INTERNAL MEDICINE

## 2023-01-27 PROCEDURE — 1160F RVW MEDS BY RX/DR IN RCRD: CPT | Mod: CPTII,,, | Performed by: INTERNAL MEDICINE

## 2023-01-27 PROCEDURE — 99999 PR PBB SHADOW E&M-EST. PATIENT-LVL III: CPT | Mod: PBBFAC,,, | Performed by: INTERNAL MEDICINE

## 2023-01-27 PROCEDURE — 1159F PR MEDICATION LIST DOCUMENTED IN MEDICAL RECORD: ICD-10-PCS | Mod: CPTII,,, | Performed by: INTERNAL MEDICINE

## 2023-01-27 PROCEDURE — 1159F MED LIST DOCD IN RCRD: CPT | Mod: CPTII,,, | Performed by: INTERNAL MEDICINE

## 2023-01-27 PROCEDURE — 1160F PR REVIEW ALL MEDS BY PRESCRIBER/CLIN PHARMACIST DOCUMENTED: ICD-10-PCS | Mod: CPTII,,, | Performed by: INTERNAL MEDICINE

## 2023-01-27 PROCEDURE — 3080F PR MOST RECENT DIASTOLIC BLOOD PRESSURE >= 90 MM HG: ICD-10-PCS | Mod: CPTII,,, | Performed by: INTERNAL MEDICINE

## 2023-01-27 PROCEDURE — 99205 OFFICE O/P NEW HI 60 MIN: CPT | Mod: S$PBB,,, | Performed by: INTERNAL MEDICINE

## 2023-01-27 PROCEDURE — 3008F BODY MASS INDEX DOCD: CPT | Mod: CPTII,,, | Performed by: INTERNAL MEDICINE

## 2023-01-27 PROCEDURE — 3075F PR MOST RECENT SYSTOLIC BLOOD PRESS GE 130-139MM HG: ICD-10-PCS | Mod: CPTII,,, | Performed by: INTERNAL MEDICINE

## 2023-01-27 RX ORDER — CLONAZEPAM 1 MG/1
1 TABLET ORAL 2 TIMES DAILY PRN
COMMUNITY

## 2023-01-27 NOTE — PROGRESS NOTES
"Subjective:    Patient ID:  Castillo CACERES Vasquez is a 47 y.o. male who presents for evaluation of Palpitations      Pt referred by Dr. Norma Hernandez      HPI pt presents for ER f/u.  Has chronic anxiety, PTSD, hypertriglyceridemia.  Used to work for fire dept.  Nonsmoker.  Brother has CAD with PCI in his 50's.  Father had CAD in 70's,  of MI in early 80's (in prison).  Seen in ER earlier this month for palpitations and anxiety per chart.  Ecg showed mild sinus tachycardia, inferolateral st-t abnl.  - troponin and BNP levels.  No acute abnl on CXR.  Was out of clonazepam for a while.  ER staff prescribed Propranolol for BP/palpitations and Vistaril for anxiety.  Propranolol has helped; did not tolerate Vistaril.  On clonazepam prn.  States he has a lot of anxiety in his life.  Undergoing divorce.  "Weird" cp, left lower chest, not sure if it is gas.  Spontaneous, short lasting, sporadic.  Works out a lot and feels ok but also sometimes notices sxs of CP, dyspnea, elevated HR.  Had Covid 2 years ago, and since then has had some dyspnea.  He thinks his sxs are from his anxiety/PTSD conditions but is unsure at times.  TG real high a year ago, PCP just rechecked improved but still high in 300's.  Elevated blood sugar 108 or so.  He is trying to work on diet, exercise.  Is real good on exercise for long time.      Past Medical History:   Diagnosis Date    Abnormal ECG 2023    Allergy     Asthma     Essential hypertension 2023    Family history of cardiovascular disease 2023    GERD (gastroesophageal reflux disease)     Hypertriglyceridemia 2023       Current Outpatient Medications:     clonazePAM (KLONOPIN) 1 MG tablet, Take 1 mg by mouth 2 (two) times daily as needed for Anxiety., Disp: , Rfl:     hydrOXYzine pamoate (VISTARIL) 25 MG Cap, Take 1 capsule (25 mg total) by mouth every 6 (six) hours as needed (anxiety)., Disp: 20 capsule, Rfl: 0    propranoloL (INDERAL) 20 MG tablet, Take 0.5 tablets (10 mg " "total) by mouth 2 (two) times daily. (Patient taking differently: Take 20 mg by mouth 2 (two) times daily.), Disp: 60 tablet, Rfl: 0        Review of Systems   Constitutional: Negative.   HENT: Negative.     Eyes: Negative.    Cardiovascular:  Positive for chest pain, dyspnea on exertion and palpitations.   Respiratory:  Positive for shortness of breath.    Endocrine: Negative.    Hematologic/Lymphatic: Negative.    Skin: Negative.    Musculoskeletal: Negative.    Gastrointestinal: Negative.    Genitourinary: Negative.    Neurological: Negative.    Psychiatric/Behavioral:  The patient is nervous/anxious.    Allergic/Immunologic: Negative.         BP (!) 132/90 (BP Location: Left arm, Patient Position: Sitting, BP Method: Large (Manual))   Pulse 87   Resp 16   Ht 5' 9" (1.753 m)   Wt 106.9 kg (235 lb 10.8 oz)   SpO2 95%   BMI 34.80 kg/m²     Wt Readings from Last 3 Encounters:   01/27/23 106.9 kg (235 lb 10.8 oz)   01/08/23 105.5 kg (232 lb 9.4 oz)   05/10/21 104.5 kg (230 lb 6.1 oz)     Temp Readings from Last 3 Encounters:   01/08/23 98 °F (36.7 °C) (Oral)   05/10/21 98.2 °F (36.8 °C) (Oral)   01/20/21 98.7 °F (37.1 °C) (Oral)     BP Readings from Last 3 Encounters:   01/27/23 (!) 132/90   01/08/23 137/80   05/10/21 124/76     Pulse Readings from Last 3 Encounters:   01/27/23 87   01/08/23 74   05/10/21 85       Objective:    Physical Exam  Vitals and nursing note reviewed.   Constitutional:       Appearance: He is well-developed.   HENT:      Head: Normocephalic.   Neck:      Thyroid: No thyromegaly.      Vascular: Normal carotid pulses. No carotid bruit, hepatojugular reflux or JVD.   Cardiovascular:      Rate and Rhythm: Normal rate and regular rhythm.      Chest Wall: PMI is not displaced.      Pulses:           Radial pulses are 2+ on the right side and 2+ on the left side.      Heart sounds: S1 normal and S2 normal. Heart sounds not distant. No midsystolic click and no opening snap. No murmur heard.    " No friction rub. No S3 or S4 sounds.   Pulmonary:      Effort: Pulmonary effort is normal.      Breath sounds: Normal breath sounds. No wheezing or rales.   Abdominal:      General: Bowel sounds are normal. There is no distension or abdominal bruit.      Palpations: Abdomen is soft. There is no mass.      Tenderness: There is no abdominal tenderness.   Musculoskeletal:      Cervical back: Normal range of motion and neck supple.   Skin:     General: Skin is warm.   Neurological:      Mental Status: He is alert and oriented to person, place, and time.   Psychiatric:         Behavior: Behavior normal.     I have reviewed all pertinent labs and cardiac studies.      Chemistry        Component Value Date/Time     01/08/2023 1129    K 3.7 01/08/2023 1129     01/08/2023 1129    CO2 21 (L) 01/08/2023 1129    BUN 14 01/08/2023 1129    CREATININE 1.0 01/08/2023 1129     01/08/2023 1129        Component Value Date/Time    CALCIUM 9.6 01/08/2023 1129    ALKPHOS 66 01/08/2023 1129    AST 22 01/08/2023 1129    ALT 27 01/08/2023 1129    BILITOT 0.5 01/08/2023 1129    ESTGFRAFRICA >60 05/10/2021 2126    EGFRNONAA >60 05/10/2021 2126        Lab Results   Component Value Date    WBC 5.11 01/08/2023    HGB 14.7 01/08/2023    HCT 42.5 01/08/2023    MCV 86 01/08/2023     01/08/2023       Lab Results   Component Value Date    HGBA1C 5.4 02/18/2022       Order: [id number]   Ref Range & Units 2 d ago   Cholesterol 100 - 199 mg/dL 185    Triglycerides 0 - 149 mg/dL 380 High     HDL >39 mg/dL 30 Low     VLDL Cholesterol Selvin 5 - 40 mg/dL 63 High     LDL Calculated 0 - 99 mg/dL 92            Assessment:       1. Atypical chest pain    2. Palpitations    3. Abnormal ECG    4. Essential hypertension    5. Family history of cardiovascular disease    6. Hypertriglyceridemia         Plan:             Chest pain sxs.  Abnormal ecg with possible inferolateral ischemia.  Chronic PTSD/stress/anxiety issues that complicate  matters.  Ischemia evaluation.  Stress echocardiogram.  Discussed possible LHC if stress echo + for ischemia.  Risks/benefits of LHC/PCI discussed.  1 week Vital Holter or 48 hour Holter as insurance coverage allows.  Cardiac diet.  Goal BP < 130/80.  Continue Propranolol bid.  Home BP monitoring.  F/u w PCP as scheduled in near future for BP/lipids, blood sugar tx.  Daily exercise as his doing.    PHONE REVIEW.          I have reviewed all pertinent labs and cardiac studies independently. Plans and recommendations have been formulated under my direct supervision. All questions answered and patient voiced understanding.

## 2023-02-09 ENCOUNTER — TELEPHONE (OUTPATIENT)
Dept: CARDIOLOGY | Facility: CLINIC | Age: 48
End: 2023-02-09

## 2023-02-09 ENCOUNTER — HOSPITAL ENCOUNTER (OUTPATIENT)
Dept: CARDIOLOGY | Facility: HOSPITAL | Age: 48
Discharge: HOME | End: 2023-02-09
Attending: INTERNAL MEDICINE
Payer: MEDICAID

## 2023-02-09 VITALS
HEART RATE: 74 BPM | SYSTOLIC BLOOD PRESSURE: 119 MMHG | HEIGHT: 69 IN | DIASTOLIC BLOOD PRESSURE: 92 MMHG | WEIGHT: 235 LBS | BODY MASS INDEX: 34.8 KG/M2

## 2023-02-09 DIAGNOSIS — R07.89 ATYPICAL CHEST PAIN: ICD-10-CM

## 2023-02-09 DIAGNOSIS — I10 ESSENTIAL HYPERTENSION: ICD-10-CM

## 2023-02-09 DIAGNOSIS — Z82.49 FAMILY HISTORY OF CARDIOVASCULAR DISEASE: ICD-10-CM

## 2023-02-09 DIAGNOSIS — R94.31 ABNORMAL ECG: ICD-10-CM

## 2023-02-09 DIAGNOSIS — R00.2 PALPITATIONS: ICD-10-CM

## 2023-02-09 LAB
AORTIC ROOT ANNULUS: 4.02 CM
ASCENDING AORTA: 3.6 CM
AV INDEX (PROSTH): 0.82
AV MEAN GRADIENT: 3 MMHG
AV PEAK GRADIENT: 5 MMHG
AV VALVE AREA: 2.87 CM2
AV VELOCITY RATIO: 0.79
BSA FOR ECHO PROCEDURE: 2.28 M2
CV ECHO LV RWT: 0.43 CM
CV STRESS BASE HR: 75 BPM
DIASTOLIC BLOOD PRESSURE: 92 MMHG
DOP CALC AO PEAK VEL: 1.15 M/S
DOP CALC AO VTI: 20.8 CM
DOP CALC LVOT AREA: 3.5 CM2
DOP CALC LVOT DIAMETER: 2.11 CM
DOP CALC LVOT PEAK VEL: 0.91 M/S
DOP CALC LVOT STROKE VOLUME: 59.76 CM3
DOP CALC RVOT PEAK VEL: 0.87 M/S
DOP CALC RVOT VTI: 15.8 CM
DOP CALCLVOT PEAK VEL VTI: 17.1 CM
E WAVE DECELERATION TIME: 231.59 MSEC
E/A RATIO: 0.79
E/E' RATIO: 6.24 M/S
ECHO LV POSTERIOR WALL: 1.13 CM (ref 0.6–1.1)
EJECTION FRACTION: 60 %
FRACTIONAL SHORTENING: 28 % (ref 28–44)
INTERVENTRICULAR SEPTUM: 1 CM (ref 0.6–1.1)
IVRT: 122.74 MSEC
LA MAJOR: 4.39 CM
LA MINOR: 4.82 CM
LA WIDTH: 3.7 CM
LEFT ATRIUM SIZE: 4.23 CM
LEFT ATRIUM VOLUME INDEX MOD: 22.3 ML/M2
LEFT ATRIUM VOLUME INDEX: 27.7 ML/M2
LEFT ATRIUM VOLUME MOD: 49.34 CM3
LEFT ATRIUM VOLUME: 61.13 CM3
LEFT INTERNAL DIMENSION IN SYSTOLE: 3.76 CM (ref 2.1–4)
LEFT VENTRICLE DIASTOLIC VOLUME INDEX: 59.85 ML/M2
LEFT VENTRICLE DIASTOLIC VOLUME: 132.26 ML
LEFT VENTRICLE MASS INDEX: 97 G/M2
LEFT VENTRICLE SYSTOLIC VOLUME INDEX: 27.4 ML/M2
LEFT VENTRICLE SYSTOLIC VOLUME: 60.46 ML
LEFT VENTRICULAR INTERNAL DIMENSION IN DIASTOLE: 5.25 CM (ref 3.5–6)
LEFT VENTRICULAR MASS: 214.63 G
LV LATERAL E/E' RATIO: 5.3 M/S
LV SEPTAL E/E' RATIO: 7.57 M/S
LVOT MG: 1.91 MMHG
LVOT MV: 0.65 CM/S
MV PEAK A VEL: 0.67 M/S
MV PEAK E VEL: 0.53 M/S
MV STENOSIS PRESSURE HALF TIME: 67.16 MS
MV VALVE AREA P 1/2 METHOD: 3.28 CM2
OHS CV CPX 1 MINUTE RECOVERY HEART RATE: 130 BPM
OHS CV CPX 85 PERCENT MAX PREDICTED HEART RATE MALE: 147
OHS CV CPX ESTIMATED METS: 13
OHS CV CPX MAX PREDICTED HEART RATE: 173
OHS CV CPX PATIENT IS FEMALE: 0
OHS CV CPX PATIENT IS MALE: 1
OHS CV CPX PEAK DIASTOLIC BLOOD PRESSURE: 67 MMHG
OHS CV CPX PEAK HEAR RATE: 155 BPM
OHS CV CPX PEAK RATE PRESSURE PRODUCT: ABNORMAL
OHS CV CPX PEAK SYSTOLIC BLOOD PRESSURE: 188 MMHG
OHS CV CPX PERCENT MAX PREDICTED HEART RATE ACHIEVED: 90
OHS CV CPX RATE PRESSURE PRODUCT PRESENTING: 8925
PISA TR MAX VEL: 2.55 M/S
PV MEAN GRADIENT: 2.1 MMHG
PV PEAK VELOCITY: 0.88 CM/S
RA MAJOR: 3.91 CM
RA PRESSURE: 3 MMHG
RA WIDTH: 3.12 CM
RIGHT VENTRICULAR END-DIASTOLIC DIMENSION: 3.65 CM
SINUS: 3.91 CM
STJ: 3.97 CM
STRESS ANGINA INDEX: 0
STRESS ECHO POST EXERCISE DUR MIN: 10 MINUTES
STRESS ECHO POST EXERCISE DUR SEC: 35 SECONDS
SYSTOLIC BLOOD PRESSURE: 119 MMHG
TDI LATERAL: 0.1 M/S
TDI SEPTAL: 0.07 M/S
TDI: 0.09 M/S
TR MAX PG: 26 MMHG
TRICUSPID ANNULAR PLANE SYSTOLIC EXCURSION: 2.29 CM
TV REST PULMONARY ARTERY PRESSURE: 29 MMHG

## 2023-02-09 PROCEDURE — 93351 STRESS ECHO (CUPID ONLY): ICD-10-PCS | Mod: 26,,, | Performed by: INTERNAL MEDICINE

## 2023-02-09 PROCEDURE — 93351 STRESS TTE COMPLETE: CPT | Mod: 26,,, | Performed by: INTERNAL MEDICINE

## 2023-02-09 PROCEDURE — 93320 STRESS ECHO (CUPID ONLY): ICD-10-PCS | Mod: 26,,, | Performed by: INTERNAL MEDICINE

## 2023-02-09 PROCEDURE — 93325 DOPPLER ECHO COLOR FLOW MAPG: CPT | Mod: 26,,, | Performed by: INTERNAL MEDICINE

## 2023-02-09 PROCEDURE — 93351 STRESS TTE COMPLETE: CPT

## 2023-02-09 PROCEDURE — 93325 STRESS ECHO (CUPID ONLY): ICD-10-PCS | Mod: 26,,, | Performed by: INTERNAL MEDICINE

## 2023-02-09 PROCEDURE — 93320 DOPPLER ECHO COMPLETE: CPT | Mod: 26,,, | Performed by: INTERNAL MEDICINE

## 2023-02-09 PROCEDURE — 93248 CV CARDIAC MONITOR - 3-15 DAY ADULT (CUPID ONLY): ICD-10-PCS | Mod: ,,, | Performed by: INTERNAL MEDICINE

## 2023-02-09 PROCEDURE — 93248 EXT ECG>7D<15D REV&INTERPJ: CPT | Mod: ,,, | Performed by: INTERNAL MEDICINE

## 2023-02-10 ENCOUNTER — TELEPHONE (OUTPATIENT)
Dept: CARDIOLOGY | Facility: CLINIC | Age: 48
End: 2023-02-10
Payer: MEDICAID

## 2023-02-10 NOTE — TELEPHONE ENCOUNTER
Please call pt.  He passed his stress test.  No blockages noted. Normal heart strength.  Await Holter results.    Dr Ramsay

## 2023-02-10 NOTE — TELEPHONE ENCOUNTER
Please call pt.  He passed his stress test.  No blockages noted. Normal heart strength.  Await Holter results.    Dr Ramsay    Called patient to advise per Dr. Ramsay     No answer lvm to return call also left portal message

## 2023-02-10 NOTE — TELEPHONE ENCOUNTER
Please call pt.  He passed his stress test.  No blockages noted. Normal heart strength.  Await Holter results.     Dr Ramsay       Called patient to advise per Dr. Ramsay verbalized understanding

## 2023-02-15 ENCOUNTER — PATIENT MESSAGE (OUTPATIENT)
Dept: CARDIOLOGY | Facility: CLINIC | Age: 48
End: 2023-02-15
Payer: MEDICAID

## 2023-02-15 ENCOUNTER — PATIENT MESSAGE (OUTPATIENT)
Dept: RESEARCH | Facility: HOSPITAL | Age: 48
End: 2023-02-15
Payer: MEDICAID

## 2023-02-24 ENCOUNTER — PATIENT MESSAGE (OUTPATIENT)
Dept: RESEARCH | Facility: HOSPITAL | Age: 48
End: 2023-02-24
Payer: MEDICAID

## 2023-03-15 ENCOUNTER — PATIENT MESSAGE (OUTPATIENT)
Dept: CARDIOLOGY | Facility: CLINIC | Age: 48
End: 2023-03-15
Payer: MEDICAID

## 2023-03-15 LAB
OHS CV HOLTER SINUS AVERAGE HR: 67
OHS CV HOLTER SINUS MAX HR: 147
OHS CV HOLTER SINUS MIN HR: 40